# Patient Record
Sex: FEMALE | Race: BLACK OR AFRICAN AMERICAN
[De-identification: names, ages, dates, MRNs, and addresses within clinical notes are randomized per-mention and may not be internally consistent; named-entity substitution may affect disease eponyms.]

---

## 2020-08-29 ENCOUNTER — HOSPITAL ENCOUNTER (INPATIENT)
Dept: HOSPITAL 35 - ER | Age: 55
LOS: 8 days | Discharge: HOME | DRG: 177 | End: 2020-09-06
Attending: HOSPITALIST | Admitting: HOSPITALIST
Payer: COMMERCIAL

## 2020-08-29 VITALS — WEIGHT: 177 LBS | HEIGHT: 62.99 IN | BODY MASS INDEX: 31.36 KG/M2

## 2020-08-29 VITALS — DIASTOLIC BLOOD PRESSURE: 80 MMHG | SYSTOLIC BLOOD PRESSURE: 121 MMHG

## 2020-08-29 VITALS — SYSTOLIC BLOOD PRESSURE: 125 MMHG | DIASTOLIC BLOOD PRESSURE: 87 MMHG

## 2020-08-29 DIAGNOSIS — J30.2: ICD-10-CM

## 2020-08-29 DIAGNOSIS — Z88.8: ICD-10-CM

## 2020-08-29 DIAGNOSIS — Z79.899: ICD-10-CM

## 2020-08-29 DIAGNOSIS — K59.09: ICD-10-CM

## 2020-08-29 DIAGNOSIS — J12.89: ICD-10-CM

## 2020-08-29 DIAGNOSIS — R73.03: ICD-10-CM

## 2020-08-29 DIAGNOSIS — E87.6: ICD-10-CM

## 2020-08-29 DIAGNOSIS — N17.9: ICD-10-CM

## 2020-08-29 DIAGNOSIS — Z90.49: ICD-10-CM

## 2020-08-29 DIAGNOSIS — U07.1: Primary | ICD-10-CM

## 2020-08-29 DIAGNOSIS — I10: ICD-10-CM

## 2020-08-29 DIAGNOSIS — K59.00: ICD-10-CM

## 2020-08-29 DIAGNOSIS — Z90.710: ICD-10-CM

## 2020-08-29 LAB
ALBUMIN SERPL-MCNC: 3.5 G/DL (ref 3.4–5)
ALT SERPL-CCNC: 28 U/L (ref 30–65)
ANION GAP SERPL CALC-SCNC: 12 MMOL/L (ref 7–16)
ANISOCYTOSIS BLD QL SMEAR: (no result)
AST SERPL-CCNC: 31 U/L (ref 15–37)
BILIRUB SERPL-MCNC: 0.3 MG/DL (ref 0.2–1)
BILIRUB UR-MCNC: NEGATIVE MG/DL
BUN SERPL-MCNC: 7 MG/DL (ref 7–18)
CALCIUM SERPL-MCNC: 8.6 MG/DL (ref 8.5–10.1)
CHLORIDE SERPL-SCNC: 96 MMOL/L (ref 98–107)
CO2 SERPL-SCNC: 27 MMOL/L (ref 21–32)
COLOR UR: YELLOW
CREAT SERPL-MCNC: 1.2 MG/DL (ref 0.6–1)
ERYTHROCYTE [DISTWIDTH] IN BLOOD BY AUTOMATED COUNT: 15.3 % (ref 10.5–14.5)
GLUCOSE SERPL-MCNC: 156 MG/DL (ref 74–106)
GRANULOCYTES NFR BLD MANUAL: 37 % (ref 36–66)
HCT VFR BLD CALC: 38.4 % (ref 37–47)
HGB BLD-MCNC: 12.8 GM/DL (ref 12–15)
KETONES UR STRIP-MCNC: NEGATIVE MG/DL
LYMPHOCYTES NFR BLD AUTO: 50 % (ref 24–44)
MAGNESIUM SERPL-MCNC: 2 MG/DL (ref 1.8–2.4)
MCH RBC QN AUTO: 27.2 PG (ref 26–34)
MCHC RBC AUTO-ENTMCNC: 33.3 G/DL (ref 28–37)
MCV RBC: 81.7 FL (ref 80–100)
MONOCYTES NFR BLD: 13 % (ref 1–8)
NEUTROPHILS # BLD: 1.6 THOU/UL (ref 1.4–8.2)
PLATELET # BLD: 299 THOU/UL (ref 150–400)
POLYCHROMASIA BLD QL SMEAR: (no result)
POTASSIUM SERPL-SCNC: 3.2 MMOL/L (ref 3.5–5.1)
PROT SERPL-MCNC: 8.5 G/DL (ref 6.4–8.2)
RBC # BLD AUTO: 4.7 MIL/UL (ref 4.2–5)
RBC # UR STRIP: NEGATIVE /UL
SODIUM SERPL-SCNC: 135 MMOL/L (ref 136–145)
SP GR UR STRIP: 1.01 (ref 1–1.03)
TROPONIN I SERPL-MCNC: <0.06 NG/ML (ref ?–0.06)
URINE CLARITY: CLEAR
URINE GLUCOSE-RANDOM*: NEGATIVE
URINE LEUKOCYTES-REFLEX: NEGATIVE
URINE NITRITE-REFLEX: NEGATIVE
URINE PROTEIN (DIPSTICK): NEGATIVE
UROBILINOGEN UR STRIP-ACNC: 0.2 E.U./DL (ref 0.2–1)
WBC # BLD AUTO: 4.4 THOU/UL (ref 4–11)

## 2020-08-29 PROCEDURE — 10879: CPT

## 2020-08-30 VITALS — SYSTOLIC BLOOD PRESSURE: 121 MMHG | DIASTOLIC BLOOD PRESSURE: 80 MMHG

## 2020-08-30 VITALS — SYSTOLIC BLOOD PRESSURE: 121 MMHG | DIASTOLIC BLOOD PRESSURE: 74 MMHG

## 2020-08-30 VITALS — DIASTOLIC BLOOD PRESSURE: 70 MMHG | SYSTOLIC BLOOD PRESSURE: 117 MMHG

## 2020-08-30 VITALS — SYSTOLIC BLOOD PRESSURE: 114 MMHG | DIASTOLIC BLOOD PRESSURE: 71 MMHG

## 2020-08-30 VITALS — DIASTOLIC BLOOD PRESSURE: 70 MMHG | SYSTOLIC BLOOD PRESSURE: 107 MMHG

## 2020-08-30 VITALS — SYSTOLIC BLOOD PRESSURE: 114 MMHG | DIASTOLIC BLOOD PRESSURE: 79 MMHG

## 2020-08-30 VITALS — SYSTOLIC BLOOD PRESSURE: 106 MMHG | DIASTOLIC BLOOD PRESSURE: 73 MMHG

## 2020-08-30 LAB
ANION GAP SERPL CALC-SCNC: 10 MMOL/L (ref 7–16)
BUN SERPL-MCNC: 6 MG/DL (ref 7–18)
CALCIUM SERPL-MCNC: 7.3 MG/DL (ref 8.5–10.1)
CHLORIDE SERPL-SCNC: 106 MMOL/L (ref 98–107)
CHOLEST SERPL-MCNC: 145 MG/DL (ref ?–200)
CO2 SERPL-SCNC: 27 MMOL/L (ref 21–32)
CREAT SERPL-MCNC: 0.9 MG/DL (ref 0.6–1)
ERYTHROCYTE [DISTWIDTH] IN BLOOD BY AUTOMATED COUNT: 15.3 % (ref 10.5–14.5)
GLUCOSE SERPL-MCNC: 90 MG/DL (ref 74–106)
HCT VFR BLD CALC: 34 % (ref 37–47)
HDLC SERPL-MCNC: 35 MG/DL (ref 40–?)
HGB BLD-MCNC: 11.5 GM/DL (ref 12–15)
LDLC SERPL-MCNC: 91 MG/DL (ref ?–100)
MCH RBC QN AUTO: 27.6 PG (ref 26–34)
MCHC RBC AUTO-ENTMCNC: 33.9 G/DL (ref 28–37)
MCV RBC: 81.3 FL (ref 80–100)
PLATELET # BLD: 272 THOU/UL (ref 150–400)
POTASSIUM SERPL-SCNC: 3.6 MMOL/L (ref 3.5–5.1)
RBC # BLD AUTO: 4.18 MIL/UL (ref 4.2–5)
SODIUM SERPL-SCNC: 143 MMOL/L (ref 136–145)
TC:HDL: 4.1 RATIO
TRIGL SERPL-MCNC: 96 MG/DL (ref ?–150)
VLDLC SERPL CALC-MCNC: 19 MG/DL (ref ?–40)
WBC # BLD AUTO: 4.5 THOU/UL (ref 4–11)

## 2020-08-30 NOTE — NUR
PT IS ALERT AND ORIENTED X4. HR ELEVATED 125 TEMP 100.3. TYENOL GIVEN AND
ENC PO FLUIDS. HR DOWN  PRESENTLY. BED DOWN . CALL LIGHT IN REACH. SCDS
ON. WILL CONTINUE TO MONITOR PT FOR CHANGES.

## 2020-08-30 NOTE — NUR
PT ADMITTED FROM ER AROUND 00:55AM VSS  102.2 TEMP TACHYCARDIAC 105-111.
ST ON MONITOR. NS  ML /HR STARTED. AZITHROMYCIN STARTED. TYLENOL GIVEN
FOR TEMP. INSTRUCTED PT ON FALL PRECAUTIONS. BED ALARM ON. BED IN LOW LOCKED
POSITION. ORIENTED PT TO ROOM.

## 2020-08-30 NOTE — NUR
ASSUMED PATIENT CARE THIS AM AT APPROXIMATELY 0700. PATIENT AWAKE ALERT
ORIENTED. IN NO ACUTE DISTRESS. MEDS AND ASSESSMENT AS CHARTED. HAVING A DRY
STEADY COUGH. NO FEVERS NOTED THIS SHIFT. NO COMPLAINTS OF SOB. CARDIO CONSULT
TO SEE PATIENT. PATIENT DENIES ANY COMPLAINTS OF
DIZZINESS WHILE STANDING. NEW CONSULT FOR STEVEN CALLED TO ANSWERING SERVICE.
ACCUCHECKS CHANGED TO DAILY PER PATIENT REQUEST. STATES SHE NEVER CHECKS HER
BLOOD SUGAR AT HOME AND IT IS HARDLY EVER HIGH. DOES NOT WANT TO GET STUCK 4X
A DAY. TOLERATING IVF AS ORDERED. NO COMPLAINTS OF PAIN THIS SHIFT

## 2020-08-31 VITALS — SYSTOLIC BLOOD PRESSURE: 138 MMHG | DIASTOLIC BLOOD PRESSURE: 87 MMHG

## 2020-08-31 VITALS — DIASTOLIC BLOOD PRESSURE: 85 MMHG | SYSTOLIC BLOOD PRESSURE: 124 MMHG

## 2020-08-31 VITALS — DIASTOLIC BLOOD PRESSURE: 98 MMHG | SYSTOLIC BLOOD PRESSURE: 157 MMHG

## 2020-08-31 VITALS — SYSTOLIC BLOOD PRESSURE: 133 MMHG | DIASTOLIC BLOOD PRESSURE: 86 MMHG

## 2020-08-31 VITALS — DIASTOLIC BLOOD PRESSURE: 91 MMHG | SYSTOLIC BLOOD PRESSURE: 154 MMHG

## 2020-08-31 VITALS — DIASTOLIC BLOOD PRESSURE: 84 MMHG | SYSTOLIC BLOOD PRESSURE: 118 MMHG

## 2020-08-31 VITALS — DIASTOLIC BLOOD PRESSURE: 87 MMHG | SYSTOLIC BLOOD PRESSURE: 125 MMHG

## 2020-08-31 LAB
BASOPHILS NFR BLD AUTO: 0.4 % (ref 0–2)
EOSINOPHIL NFR BLD: 0.1 % (ref 0–3)
ERYTHROCYTE [DISTWIDTH] IN BLOOD BY AUTOMATED COUNT: 15.7 % (ref 10.5–14.5)
EST. AVERAGE GLUCOSE BLD GHB EST-MCNC: 146 MG/DL
GLYCOHEMOGLOBIN (HGB A1C): 6.7 % (ref 4.8–5.6)
GRANULOCYTES NFR BLD MANUAL: 41.9 % (ref 36–66)
HCT VFR BLD CALC: 31.6 % (ref 37–47)
HGB BLD-MCNC: 10.5 GM/DL (ref 12–15)
LYMPHOCYTES NFR BLD AUTO: 48.7 % (ref 24–44)
MCH RBC QN AUTO: 27.5 PG (ref 26–34)
MCHC RBC AUTO-ENTMCNC: 33.2 G/DL (ref 28–37)
MCV RBC: 82.9 FL (ref 80–100)
MONOCYTES NFR BLD: 8.9 % (ref 1–8)
NEUTROPHILS # BLD: 1.7 THOU/UL (ref 1.4–8.2)
PLATELET # BLD: 268 THOU/UL (ref 150–400)
RBC # BLD AUTO: 3.81 MIL/UL (ref 4.2–5)
WBC # BLD AUTO: 4.1 THOU/UL (ref 4–11)

## 2020-08-31 NOTE — NUR
PT PROGRESSING TOWARDS D/C GOALS VSS. NO S/S DISTRESS. NO SYNCOPAL EPISODES.
TEMP BETTER  AFTER TYLENOL ABD ABX. SEE VS.

## 2020-08-31 NOTE — NUR
Nutrition: Assessed due to consult received related to poor intake. Pt
admitted with recurrent syncopal episodes, cough and COVID pending status.
PMH: HTN, pre-DM, erika. S/w pt over phone. Reports po half of usual for a few
days since feeling ill. No weight loss. BMI 31, obesity class 1. 2 meal
intakes documented yesterday at 20% and 50%. Pt feels her appetite is now
improving. Instructed on alternative menu ordering as desired. Pt voiced no
food preferences. Current diet order heart healthy and BG controlled. Pt takes
metformin at home and does not check BG however A1C came back at 6.7%. Would
recommend adding carb controlled it pt consistently eating > 75% of meals.
Consider low nutrition risk otherwise.

## 2020-08-31 NOTE — NUR
INITIAL ASSESSMENT:
Received consult for discharge planning. SW reviewed chart and spoke with
nursing and attending physician. Pt was admitted from home due to syncope. Pt
placed in Enhanced Isolation due to positive COVID-19 test. Pt is not
requiring O2. ID consulted today to evaluate pt for treatment course. SW spoke
with pt via phone. Introduced role of SW. Pt is alert/orientated x 4. Pt
states she lives at home with family. Prior to admission, pt was independent
with ADLs. No use of DME. No hx of  services or post-acute placement. Pt's
PCP is Dr. Christiano Garcia. Pt's goal is to return home when medically stable. SW
is following to assist as needed with discharge planning.

## 2020-08-31 NOTE — EKG
[facility]
[address]
[place]   [address]                     ELECTROCARDIOGRAM REPORT      
_______________________________________________________________________________
 
Name:       DUY NUNEZ             Room #:         359-       ADM IN  
M.R.#:      [id number]                       Account #:      [id number]  
Admission:  20    Attend Phys:    Gera Gibson MD     
Discharge:              Date of Birth:  [birth year]  
                                                          Report #: [id number]
                                                                    [id number]
_______________________________________________________________________________
THIS REPORT FOR:  
 
cc:  Christiano Garcia Brady DO Lundgren,Bolivar NINO MD [facility]                                        ~
THIS REPORT FOR:   //name//                          
 
                         [facility] ED
                                       
Test Date:    2020               Test Time:    15:57:10
Pat Name:     DUY ANGULO        Department:   
Patient ID:   [id number]            Room:         [facility]
Gender:       F                        Technician:   CHET
:          1965               Requested By: Clyde Byrd
Order Number: [id number] MD:   Bolivar Wilson
                                 Measurements
Intervals                              Axis          
Rate:         106                      P:            34
NV:           157                      QRS:          -2
QRSD:         81                       T:            45
QT:           365                                    
QTc:          485                                    
                           Interpretive Statements
Sinus tachycardia
Borderline T wave abnormalities
No previous ECG available for comparison
Electronically Signed On 2020 8:13:30 CDT by Bolivar Wilson
[email or web address]
 
 
 
 
 
 
 
 
 
 
 
 
 
 
 
 
  <ELECTRONICALLY SIGNED>
   By: Bolivar Wilson MD, FAC   
  08/813
D: 20                           _____________________________________
T: 20                           Bolivar Wilson MD, [facility]     /EPI

## 2020-08-31 NOTE — NUR
PT ALERT ANFD ORIENTE X4. NO C/O PAIN. NO SOA NOTED VSS TEMP 102.2. MEDICATED
WITH 2 TYLENOL. ENC PO FLUIDS. PT VOIDING CLEAR YELLOW URINE PER BSC. SCDS ON.
CONT. IVF AND ABX AS ORDERED.

## 2020-08-31 NOTE — NUR
ASSUMED PATIENT CARE THIS AM AT APPROXIMATELY 0700. ASSESSMENT AND MEDS AS
CHARTED. VSS PATIENT AWAKE/ALERT WITHOUT ANY S/S OF DISTRESS NOTED. CONT TO
HAVE DRY COUGH BUT DENIES ANY SOB W AND W/O EXERTION. URINATING WELL. WITH
POOR PO INTAKE, DIETARY CONSULT TO SEE PATIENT TODAY REGARDING SUPP/ FOOD PREF
DENIES  ANY DIZZINESS WITH STANDING
AWIAITING TO BE SEEN BY DR. HARRIS FOR COVID CONSULT. PLAN FOR ECHO
TODAY.

## 2020-09-01 VITALS — DIASTOLIC BLOOD PRESSURE: 102 MMHG | SYSTOLIC BLOOD PRESSURE: 148 MMHG

## 2020-09-01 VITALS — DIASTOLIC BLOOD PRESSURE: 91 MMHG | SYSTOLIC BLOOD PRESSURE: 146 MMHG

## 2020-09-01 VITALS — SYSTOLIC BLOOD PRESSURE: 158 MMHG | DIASTOLIC BLOOD PRESSURE: 95 MMHG

## 2020-09-01 VITALS — SYSTOLIC BLOOD PRESSURE: 141 MMHG | DIASTOLIC BLOOD PRESSURE: 95 MMHG

## 2020-09-01 VITALS — SYSTOLIC BLOOD PRESSURE: 132 MMHG | DIASTOLIC BLOOD PRESSURE: 84 MMHG

## 2020-09-01 NOTE — NUR
SW reviewed chart and spoke with nursing and attending physician. Pt is in
Enhanced Isolation due to COVID-19. ID is following. Pt is febrile and on IV
abx. Pt is not requiring O2. Discharge home is anticipated for tomorrow
pending ID recommendations. SW is following to assist as needed with discharge
planning.

## 2020-09-01 NOTE — NUR
ASSUMED PATIENT CARE AT 0700. A/O X4. NO SOB. DRY COUGH. UP AD RAI. GENERLIZED
WEAKNESS. SOLWLY TOWARDS POC GOALS.

## 2020-09-02 VITALS — SYSTOLIC BLOOD PRESSURE: 152 MMHG | DIASTOLIC BLOOD PRESSURE: 94 MMHG

## 2020-09-02 VITALS — DIASTOLIC BLOOD PRESSURE: 93 MMHG | SYSTOLIC BLOOD PRESSURE: 145 MMHG

## 2020-09-02 VITALS — DIASTOLIC BLOOD PRESSURE: 100 MMHG | SYSTOLIC BLOOD PRESSURE: 144 MMHG

## 2020-09-02 VITALS — DIASTOLIC BLOOD PRESSURE: 106 MMHG | SYSTOLIC BLOOD PRESSURE: 159 MMHG

## 2020-09-02 VITALS — DIASTOLIC BLOOD PRESSURE: 96 MMHG | SYSTOLIC BLOOD PRESSURE: 145 MMHG

## 2020-09-02 VITALS — SYSTOLIC BLOOD PRESSURE: 129 MMHG | DIASTOLIC BLOOD PRESSURE: 88 MMHG

## 2020-09-02 VITALS — SYSTOLIC BLOOD PRESSURE: 165 MMHG | DIASTOLIC BLOOD PRESSURE: 102 MMHG

## 2020-09-02 VITALS — SYSTOLIC BLOOD PRESSURE: 153 MMHG | DIASTOLIC BLOOD PRESSURE: 98 MMHG

## 2020-09-02 VITALS — DIASTOLIC BLOOD PRESSURE: 100 MMHG | SYSTOLIC BLOOD PRESSURE: 158 MMHG

## 2020-09-02 LAB
ALBUMIN SERPL-MCNC: 2.7 G/DL (ref 3.4–5)
ALT SERPL-CCNC: 21 U/L (ref 30–65)
ANION GAP SERPL CALC-SCNC: 11 MMOL/L (ref 7–16)
AST SERPL-CCNC: 21 U/L (ref 15–37)
BASOPHILS NFR BLD AUTO: 0.6 % (ref 0–2)
BILIRUB SERPL-MCNC: 0.4 MG/DL (ref 0.2–1)
BUN SERPL-MCNC: 4 MG/DL (ref 7–18)
CALCIUM SERPL-MCNC: 8 MG/DL (ref 8.5–10.1)
CHLORIDE SERPL-SCNC: 107 MMOL/L (ref 98–107)
CO2 SERPL-SCNC: 26 MMOL/L (ref 21–32)
CREAT SERPL-MCNC: 0.7 MG/DL (ref 0.6–1)
EOSINOPHIL NFR BLD: 1.3 % (ref 0–3)
ERYTHROCYTE [DISTWIDTH] IN BLOOD BY AUTOMATED COUNT: 15.6 % (ref 10.5–14.5)
FIBRINOGEN PPP-MCNC: 423.4 MG/DL (ref 210–360)
GLUCOSE SERPL-MCNC: 89 MG/DL (ref 74–106)
GRANULOCYTES NFR BLD MANUAL: 42.6 % (ref 36–66)
HCT VFR BLD CALC: 32.7 % (ref 37–47)
HGB BLD-MCNC: 10.8 GM/DL (ref 12–15)
INR PPP: 1
LYMPHOCYTES NFR BLD AUTO: 43.9 % (ref 24–44)
MCH RBC QN AUTO: 27 PG (ref 26–34)
MCHC RBC AUTO-ENTMCNC: 32.9 G/DL (ref 28–37)
MCV RBC: 82 FL (ref 80–100)
MONOCYTES NFR BLD: 11.6 % (ref 1–8)
NEUTROPHILS # BLD: 1.6 THOU/UL (ref 1.4–8.2)
PLATELET # BLD: 330 THOU/UL (ref 150–400)
POTASSIUM SERPL-SCNC: 3.2 MMOL/L (ref 3.5–5.1)
PROT SERPL-MCNC: 7.1 G/DL (ref 6.4–8.2)
PROTHROMBIN TIME: 10.2 SECONDS (ref 9.3–11.4)
RBC # BLD AUTO: 3.99 MIL/UL (ref 4.2–5)
SODIUM SERPL-SCNC: 144 MMOL/L (ref 136–145)
WBC # BLD AUTO: 3.7 THOU/UL (ref 4–11)

## 2020-09-02 PROCEDURE — XW033E5 INTRODUCTION OF REMDESIVIR ANTI-INFECTIVE INTO PERIPHERAL VEIN, PERCUTANEOUS APPROACH, NEW TECHNOLOGY GROUP 5: ICD-10-PCS | Performed by: HOSPITALIST

## 2020-09-02 NOTE — NUR
RN ASSUMED PATIENT'S CARE AT 0700AM, PT IS A&OX3, PT CAN GET UP TO BSC BY
HERSELF AT MOST OF TIME, PT IS CONTINUING IV ABX, AND ISOLATION FOR POSITIVE
COVID, RN HAS CALLED DR TO REPORT PT'S HIGH BP, NEW ORDER HAS RECEIVED , BP
MEDICATION HAS GIVING AT 1830PM, RN HAS REPORTED TO NEXT SHIFT TO KEEP EYE ON
PT.

## 2020-09-02 NOTE — NUR
SW reviewed chart and spoke with nursing and attending physician. Pt is in
Enhanced Isolation due to COVID-19. Pt is afebrile and not requiring O2. Pt is
on IV abx. Pt started course of Remdesivir (last dose to be given 9/5). NAIL is
following to assist as needed with discharge planning.

## 2020-09-02 NOTE — NUR
PT MAKING SLOW PROGRESS TOWARDS GOALS.  NOTED PT HAS CRACKLES AUSCULTATED OVER
BOTH LOWER LOBES.  HAVE OBSERVED DRY MILD COUGH.  NO COMPLAINTS OF HEADACHE OR
DIZZINESS.  ON ROOM AIR THROUGHOUT THE NIGHT.

## 2020-09-03 VITALS — DIASTOLIC BLOOD PRESSURE: 100 MMHG | SYSTOLIC BLOOD PRESSURE: 149 MMHG

## 2020-09-03 VITALS — DIASTOLIC BLOOD PRESSURE: 87 MMHG | SYSTOLIC BLOOD PRESSURE: 131 MMHG

## 2020-09-03 VITALS — SYSTOLIC BLOOD PRESSURE: 136 MMHG | DIASTOLIC BLOOD PRESSURE: 91 MMHG

## 2020-09-03 VITALS — SYSTOLIC BLOOD PRESSURE: 150 MMHG | DIASTOLIC BLOOD PRESSURE: 94 MMHG

## 2020-09-03 VITALS — SYSTOLIC BLOOD PRESSURE: 138 MMHG | DIASTOLIC BLOOD PRESSURE: 84 MMHG

## 2020-09-03 LAB
ANION GAP SERPL CALC-SCNC: 13 MMOL/L (ref 7–16)
BUN SERPL-MCNC: 6 MG/DL (ref 7–18)
CALCIUM SERPL-MCNC: 9.2 MG/DL (ref 8.5–10.1)
CHLORIDE SERPL-SCNC: 105 MMOL/L (ref 98–107)
CO2 SERPL-SCNC: 24 MMOL/L (ref 21–32)
CREAT SERPL-MCNC: 0.6 MG/DL (ref 0.6–1)
ERYTHROCYTE [DISTWIDTH] IN BLOOD BY AUTOMATED COUNT: 15.2 % (ref 10.5–14.5)
GLUCOSE SERPL-MCNC: 204 MG/DL (ref 74–106)
HCT VFR BLD CALC: 37.5 % (ref 37–47)
HGB BLD-MCNC: 12.5 GM/DL (ref 12–15)
MCH RBC QN AUTO: 27.2 PG (ref 26–34)
MCHC RBC AUTO-ENTMCNC: 33.3 G/DL (ref 28–37)
MCV RBC: 81.5 FL (ref 80–100)
PLATELET # BLD: 415 THOU/UL (ref 150–400)
POTASSIUM SERPL-SCNC: 3.9 MMOL/L (ref 3.5–5.1)
RBC # BLD AUTO: 4.6 MIL/UL (ref 4.2–5)
SODIUM SERPL-SCNC: 142 MMOL/L (ref 136–145)
WBC # BLD AUTO: 5 THOU/UL (ref 4–11)

## 2020-09-03 NOTE — HC
[facility]
[address]
[place]   [address]                     CONSULTATION                  
_______________________________________________________________________________
 
Name:       DUY NUNEZ             Room #:         359-P       ADM IN  
M.R.#:      [id number]                       Account #:      [id number]  
Admission:  08/29/20    Attend Phys:    Gera Gibson MD     
Discharge:              Date of Birth:  [birth year]  
                                                          Report #: [id number]
                                                                    [id number]   
_______________________________________________________________________________
THIS REPORT FOR:  
 
cc:  Christiano Garcia Brady DO Mancuso,Jayme DEAN MD [facility]                                    ~
CC: Christiano Gates
 
HISTORY OF PRESENT ILLNESS:  The patient is a 55-year-old female who reviewed
the history here, comes in with recurrent syncopal episodes.  These are
certainly sounding vasovagal as they are recurring with strain of bowel movement
and micturition.  Apparently, no prior history of this, but had the classic
sweatiness, warm feeling, and then the syncopal events.  This may certainly be
underlying.  She has underlying fever and chills.  This is my understanding with
a temperature of 101 and now an antigen positive for COVID-19.  No apparent
cardiac history.  She apparently had had an event back in 01/2020 of the syncope
with constipation, so I think 2 separate issues here.  She appears to be in
sinus rhythm.  She is hemodynamically stable   The pulse is around 100, probable
physiologic with her fever, which is a relatively low-grade.  Denies chest pain
or angina.  No significant shortness of breath.
 
LABORATORY WORK:  Potassium is 3.2, creatinine 1.2.  Liver function tests were
normal.  Troponin was negative.  Thyroid was lower limits at 0.354, free T4 was
normal.  D-dimer was normal.
 
She had CT head, and CT chest, which were unremarkable.
 
Her white count is 4.4.  There is no lymphopenia.  H and H are 12 and 38.  Urine
was negative.
 
PAST MEDICAL HISTORY:  Really negative except for has had some borderline
tachycardic histories in the past, she states and these syncopal events which
started in January of this year.
 
ALLERGIES:  NAPROSYN.
 
PAST MEDICAL HISTORY:  Prediabetic, on metformin; hysterectomy, knee surgery,
breast reduction, and cholecystectomy.
 
SOCIAL HISTORY:  Not a tobacco user.  Social alcohol.
 
HOME MEDICATIONS:  Hydrochlorothiazide, metformin, loratadine, and metoprolol 25
b.i.d.
 
FAMILY HISTORY:  Negative.  Mother did have some CHF, but she was older.
 
 
 
 
[facility]
[address]
[place]   [address]                     CONSULTATION                  
_______________________________________________________________________________
 
Name:       DUY NUNEZ             Room #:         [address] [place] [address] [place]  
M.R.#:      [id number]                       Account #:      [id number]  
Admission:  08/29/20    Attend Phys:    Gera Gibson MD     
Discharge:              Date of Birth:  [birth year]  
                                                          Report #: [id number]
                                                                    [id number]   
_______________________________________________________________________________
PHYSICAL EXAMINATION:
VITAL SIGNS:  Per my discussion with the hospitalist, pulse is 100, blood
pressure 126/70.
HEENT:  Eyes reveal xanthelasmas.  Pharynx is clear.
NECK:  Shows preserved upstrokes without JVD or bruits.
LUNGS:  Clear.
CARDIOVASCULAR:  Regular rate and rhythm, S1, S2.  There is no significant
murmur.
ABDOMEN:  Soft, nontender.
EXTREMITIES:  Trace of edema.  Distal pulses intact.
NEUROLOGIC:  Nonfocal.
SKIN:  Warm and dry without xanthoma or ulcer.
 
ASSESSMENT:
1.  Recurrent syncope consistent with vasovagal post-micturition and bowel
movement strain.
2.  Borderline tachycardia, possibly secondary to relative hypovolemia or
history of tachycardia, would hold phentermine.
3.  COVID antigen positive, awaiting PCR, but currently on isolation with fever
and some cough.
4.  Hypertension.
5.  Prediabetes.  Hemoglobin A1c pending.
 
RECOMMENDATIONS AND PLAN:  We will follow with you.  We will obtain echo.  This
is the weekend and will wait for the PCR results.  No obvious evidence of heart
failure here and I believe there are 2 concomitant issues with the COVID, would
maybe exacerbated her volume status on top of vasovagal recurrent syncope.
 
Thank you for asking me to assist in the care of this patient.
 
 
 
 
 
 
 
 
 
 
 
 
 
 
 
  <ELECTRONICALLY SIGNED>
   By: Jayme Beaulieu MD, FACC   
  09/03/20     0827
D: 08/30/20 1029                           _____________________________________
T: 08/30/20 1212                           Jayme Beaulieu MD, FACC     /nt

## 2020-09-03 NOTE — NUR
ASSUMED PATIENT CARE THIS AM AT APPROXIMATELY 0700. AWAKE ALERT ORIENTED, NO
DISTRESS NOTED. PATIENT ASSESSMENT AND MEDS AS CHARTED. PATIENT WITH DRY
COUGH. EDUCATED PATIENT ON NEED TO COMPLETE FULL COURSE OF REMDESIVIR. STATES
UNDERSTANDING OF PLAN. PATIENT REFUSED PO STOOL SOFTNER, STATES SHE IS HAVING
RECURRENT LOOSE STOOLS. TWO BMS TODAY

## 2020-09-03 NOTE — NUR
SW reviewed chart and spoke with nursing and attending physician. Pt is in
Enhanced Isolation due to COVID-19. Pt is afebrile and not requiring O2. Pt
is on IV abx and IV steroids. Pt is completing course of Remdesivir. Plan is
for pt to discharge home when medically stable. SW is following to assist as
needed with discharge planning.

## 2020-09-03 NOTE — NUR
ASSUMED CARE FROM ARIADNA SHIFT PT RESTING IN BED , IV MEDICATION WELL LOOSE
NON-PRODUCTIVE COUGH NOTED. PT HAS 2 LARGE SOFT STOOL, HR INCREASE  WHEN
UP TO BATHROOM . PT RESTING WELL THROUHGOUT HOULRY ROUNDS WILL CONINTUE WITH
POC AND WILL REPORT CHANGES OR ABNORMAL FINDINGS

## 2020-09-04 VITALS — DIASTOLIC BLOOD PRESSURE: 83 MMHG | SYSTOLIC BLOOD PRESSURE: 133 MMHG

## 2020-09-04 VITALS — SYSTOLIC BLOOD PRESSURE: 135 MMHG | DIASTOLIC BLOOD PRESSURE: 86 MMHG

## 2020-09-04 VITALS — SYSTOLIC BLOOD PRESSURE: 169 MMHG | DIASTOLIC BLOOD PRESSURE: 96 MMHG

## 2020-09-04 VITALS — SYSTOLIC BLOOD PRESSURE: 162 MMHG | DIASTOLIC BLOOD PRESSURE: 93 MMHG

## 2020-09-04 VITALS — SYSTOLIC BLOOD PRESSURE: 150 MMHG | DIASTOLIC BLOOD PRESSURE: 95 MMHG

## 2020-09-04 LAB
ALBUMIN SERPL-MCNC: 2.9 G/DL (ref 3.4–5)
ALT SERPL-CCNC: 25 U/L (ref 30–65)
ANION GAP SERPL CALC-SCNC: 12 MMOL/L (ref 7–16)
AST SERPL-CCNC: 18 U/L (ref 15–37)
BILIRUB SERPL-MCNC: 0.3 MG/DL (ref 0.2–1)
BUN SERPL-MCNC: 11 MG/DL (ref 7–18)
CALCIUM SERPL-MCNC: 8.6 MG/DL (ref 8.5–10.1)
CHLORIDE SERPL-SCNC: 108 MMOL/L (ref 98–107)
CO2 SERPL-SCNC: 23 MMOL/L (ref 21–32)
CREAT SERPL-MCNC: 0.7 MG/DL (ref 0.6–1)
GLUCOSE SERPL-MCNC: 184 MG/DL (ref 74–106)
POTASSIUM SERPL-SCNC: 3.9 MMOL/L (ref 3.5–5.1)
PROT SERPL-MCNC: 7.6 G/DL (ref 6.4–8.2)
SODIUM SERPL-SCNC: 143 MMOL/L (ref 136–145)

## 2020-09-04 NOTE — NUR
ANIL reviewed chart and spoke with nursing and attending physician. Pt remains
in Enhanced Isolation due to COVID-19. Pt is afebrile and not requiring O2. Pt
is on IV abx and IV steroids. Pt will complete course of Remdesivir on 9/5.
Possible weekend discharge. ANIL spoke with pt via phone to discuss discharge
plan. Pt is aware of possible discharge and denies having any discharge needs
at this time. ANIL is following and is available to assist should needs arise.

## 2020-09-04 NOTE — NUR
ASSUMED PATIENT CARE AT 0700. A/O X4. NO SOB NOTED. AMBULATED IN ROOM. BP
ELEVATED. AFEBRILE. SOLELY TOWARDS POC GOALS.

## 2020-09-04 NOTE — NUR
ASSUMED CARE OF PATIENT AT 1900. AT ASSESSMENT PATIENT STATED SHE STILL HAD A
SLIGHT HEADACHE, RATING IT A 3/10. TYLENOL ADMINISTERED ON PREVIOUS SHIFT AND
PATIENT STATED IT WAS EFFECTIVE. PATIENT SR/ST ON MONITOR. AMBULATES WELL
WITHOUT ASSISTANCE. PATIENT APPEARED TO REST WELL THROUGH THE NOC AND IS
PROGRESSING TOWARDS GOALS.

## 2020-09-04 NOTE — NUR
1900 ASSUMED CARE OF PT AFTER REPORT. 2030 BASELINE ASSESSMENT COMPLETED PT
RESP UNLABORED, AND LUNGS WITH COARSE CRACKLES TO BLL, IV INFUSING PT ON CONT
CARDIAC MONITOR, REFUSES SCD'S FALL PRECAUTIONS IN PLACE WILL CONTINUE TO
MONITOR

## 2020-09-05 VITALS — DIASTOLIC BLOOD PRESSURE: 85 MMHG | SYSTOLIC BLOOD PRESSURE: 145 MMHG

## 2020-09-05 VITALS — DIASTOLIC BLOOD PRESSURE: 92 MMHG | SYSTOLIC BLOOD PRESSURE: 147 MMHG

## 2020-09-05 VITALS — SYSTOLIC BLOOD PRESSURE: 161 MMHG | DIASTOLIC BLOOD PRESSURE: 87 MMHG

## 2020-09-05 VITALS — SYSTOLIC BLOOD PRESSURE: 173 MMHG | DIASTOLIC BLOOD PRESSURE: 105 MMHG

## 2020-09-05 VITALS — SYSTOLIC BLOOD PRESSURE: 148 MMHG | DIASTOLIC BLOOD PRESSURE: 91 MMHG

## 2020-09-05 VITALS — DIASTOLIC BLOOD PRESSURE: 92 MMHG | SYSTOLIC BLOOD PRESSURE: 152 MMHG

## 2020-09-05 NOTE — NUR
PT WATCHING TV IN THE DARK. PT STATED SHE WAS WAITING TO EAT HER SALAD. PT C/O
HEADACHE, TYLENOL PROVIDED. BP ELEVATED AND PRN HYDRALAZINE PROVIDED. LUNGS
WITH WHEEZES. REMDESISIVIR IV GIVEN. PT REPORTS PLANS FOR DC TOMORROW AFTER
LAST DOSE. PT INDEPENDENT WITH AMBULATION.

## 2020-09-06 VITALS — DIASTOLIC BLOOD PRESSURE: 79 MMHG | SYSTOLIC BLOOD PRESSURE: 141 MMHG

## 2020-09-06 VITALS — DIASTOLIC BLOOD PRESSURE: 90 MMHG | SYSTOLIC BLOOD PRESSURE: 160 MMHG

## 2020-09-06 VITALS — SYSTOLIC BLOOD PRESSURE: 141 MMHG | DIASTOLIC BLOOD PRESSURE: 79 MMHG

## 2020-09-06 VITALS — DIASTOLIC BLOOD PRESSURE: 89 MMHG | SYSTOLIC BLOOD PRESSURE: 155 MMHG

## 2020-10-08 ENCOUNTER — HOSPITAL ENCOUNTER (OUTPATIENT)
Dept: HOSPITAL 35 - SJCVCIMAG | Age: 55
End: 2020-10-08
Attending: INTERNAL MEDICINE
Payer: COMMERCIAL

## 2020-10-08 DIAGNOSIS — I50.9: ICD-10-CM

## 2020-10-08 DIAGNOSIS — R55: Primary | ICD-10-CM

## 2021-10-12 ENCOUNTER — HOSPITAL ENCOUNTER (EMERGENCY)
Dept: HOSPITAL 35 - ER | Age: 56
LOS: 1 days | Discharge: HOME | End: 2021-10-13
Payer: COMMERCIAL

## 2021-10-12 VITALS — HEIGHT: 62 IN | WEIGHT: 185.01 LBS | BODY MASS INDEX: 34.05 KG/M2

## 2021-10-12 DIAGNOSIS — R10.84: Primary | ICD-10-CM

## 2021-10-12 DIAGNOSIS — Z91.09: ICD-10-CM

## 2021-10-12 DIAGNOSIS — Z79.891: ICD-10-CM

## 2021-10-12 DIAGNOSIS — Z90.49: ICD-10-CM

## 2021-10-12 DIAGNOSIS — Z88.8: ICD-10-CM

## 2021-10-12 DIAGNOSIS — Z79.899: ICD-10-CM

## 2021-10-12 DIAGNOSIS — I10: ICD-10-CM

## 2021-10-12 DIAGNOSIS — Z79.84: ICD-10-CM

## 2021-10-12 DIAGNOSIS — Z98.890: ICD-10-CM

## 2021-10-12 DIAGNOSIS — Z90.710: ICD-10-CM

## 2021-10-12 LAB
ALBUMIN SERPL-MCNC: 3.7 G/DL (ref 3.4–5)
ALT SERPL-CCNC: 25 U/L (ref 30–65)
ANION GAP SERPL CALC-SCNC: 8 MMOL/L (ref 7–16)
AST SERPL-CCNC: 18 U/L (ref 15–37)
BASOPHILS NFR BLD AUTO: 1.2 % (ref 0–2)
BILIRUB SERPL-MCNC: 0.3 MG/DL (ref 0.2–1)
BILIRUB UR-MCNC: NEGATIVE MG/DL
BUN SERPL-MCNC: 9 MG/DL (ref 7–18)
CALCIUM SERPL-MCNC: 8.8 MG/DL (ref 8.5–10.1)
CHLORIDE SERPL-SCNC: 106 MMOL/L (ref 98–107)
CO2 SERPL-SCNC: 29 MMOL/L (ref 21–32)
COLOR UR: YELLOW
CREAT SERPL-MCNC: 0.9 MG/DL (ref 0.6–1)
EOSINOPHIL NFR BLD: 3 % (ref 0–3)
ERYTHROCYTE [DISTWIDTH] IN BLOOD BY AUTOMATED COUNT: 15.7 % (ref 10.5–14.5)
GLUCOSE SERPL-MCNC: 107 MG/DL (ref 74–106)
GRANULOCYTES NFR BLD MANUAL: 48.7 % (ref 36–66)
HCT VFR BLD CALC: 37.2 % (ref 37–47)
HGB BLD-MCNC: 12.2 GM/DL (ref 12–15)
KETONES UR STRIP-MCNC: (no result) MG/DL
LIPASE: 101 U/L (ref 73–393)
LYMPHOCYTES NFR BLD AUTO: 37.7 % (ref 24–44)
MCH RBC QN AUTO: 26.5 PG (ref 26–34)
MCHC RBC AUTO-ENTMCNC: 32.6 G/DL (ref 28–37)
MCV RBC: 81.2 FL (ref 80–100)
MONOCYTES NFR BLD: 9.4 % (ref 1–8)
NEUTROPHILS # BLD: 4.1 THOU/UL (ref 1.4–8.2)
PLATELET # BLD: 380 THOU/UL (ref 150–400)
POTASSIUM SERPL-SCNC: 3.4 MMOL/L (ref 3.5–5.1)
PROT SERPL-MCNC: 8.1 G/DL (ref 6.4–8.2)
RBC # BLD AUTO: 4.58 MIL/UL (ref 4.2–5)
RBC # UR STRIP: NEGATIVE /UL
SODIUM SERPL-SCNC: 143 MMOL/L (ref 136–145)
SP GR UR STRIP: 1.02 (ref 1–1.03)
URINE CLARITY: (no result)
URINE GLUCOSE-RANDOM*: NEGATIVE
URINE LEUKOCYTES-REFLEX: NEGATIVE
URINE NITRITE-REFLEX: NEGATIVE
URINE PROTEIN (DIPSTICK): NEGATIVE
UROBILINOGEN UR STRIP-ACNC: 0.2 E.U./DL (ref 0.2–1)
WBC # BLD AUTO: 8.4 THOU/UL (ref 4–11)

## 2021-10-13 VITALS — SYSTOLIC BLOOD PRESSURE: 146 MMHG | DIASTOLIC BLOOD PRESSURE: 88 MMHG
